# Patient Record
Sex: FEMALE | Race: WHITE | NOT HISPANIC OR LATINO | Employment: FULL TIME | URBAN - METROPOLITAN AREA
[De-identification: names, ages, dates, MRNs, and addresses within clinical notes are randomized per-mention and may not be internally consistent; named-entity substitution may affect disease eponyms.]

---

## 2017-04-17 ENCOUNTER — OFFICE VISIT - HEALTHEAST (OUTPATIENT)
Dept: FAMILY MEDICINE | Facility: CLINIC | Age: 57
End: 2017-04-17

## 2017-04-17 DIAGNOSIS — E78.5 DYSLIPIDEMIA: ICD-10-CM

## 2017-04-17 DIAGNOSIS — Z11.3 SCREEN FOR STD (SEXUALLY TRANSMITTED DISEASE): ICD-10-CM

## 2017-04-21 ENCOUNTER — AMBULATORY - HEALTHEAST (OUTPATIENT)
Dept: LAB | Facility: CLINIC | Age: 57
End: 2017-04-21

## 2017-04-21 DIAGNOSIS — Z11.3 SCREEN FOR STD (SEXUALLY TRANSMITTED DISEASE): ICD-10-CM

## 2017-04-21 DIAGNOSIS — E78.5 DYSLIPIDEMIA: ICD-10-CM

## 2017-04-21 LAB
CHOLEST SERPL-MCNC: 219 MG/DL
FASTING STATUS PATIENT QL REPORTED: YES
HDLC SERPL-MCNC: 87 MG/DL
HIV 1+2 AB+HIV1 P24 AG SERPL QL IA: NEGATIVE
LDLC SERPL CALC-MCNC: 121 MG/DL
TRIGL SERPL-MCNC: 55 MG/DL

## 2017-04-22 LAB — SYPHILIS RPR SCREEN - HISTORICAL: NORMAL

## 2017-04-24 ENCOUNTER — COMMUNICATION - HEALTHEAST (OUTPATIENT)
Dept: FAMILY MEDICINE | Facility: CLINIC | Age: 57
End: 2017-04-24

## 2017-04-24 LAB
HAV IGM SERPL QL IA: NEGATIVE
HBV CORE IGM SERPL QL IA: NEGATIVE
HBV SURFACE AG SERPL QL IA: NEGATIVE
HCV AB SERPL QL IA: NEGATIVE
HSV1 IGG SERPL QL IA: POSITIVE
HSV2 IGG SERPL QL IA: NEGATIVE

## 2017-07-12 ENCOUNTER — COMMUNICATION - HEALTHEAST (OUTPATIENT)
Dept: FAMILY MEDICINE | Facility: CLINIC | Age: 57
End: 2017-07-12

## 2017-07-12 DIAGNOSIS — E78.5 DYSLIPIDEMIA: ICD-10-CM

## 2017-12-11 ENCOUNTER — COMMUNICATION - HEALTHEAST (OUTPATIENT)
Dept: TELEHEALTH | Facility: CLINIC | Age: 57
End: 2017-12-11

## 2017-12-11 ENCOUNTER — OFFICE VISIT - HEALTHEAST (OUTPATIENT)
Dept: FAMILY MEDICINE | Facility: CLINIC | Age: 57
End: 2017-12-11

## 2017-12-11 DIAGNOSIS — R19.7 DIARRHEA: ICD-10-CM

## 2017-12-13 ENCOUNTER — COMMUNICATION - HEALTHEAST (OUTPATIENT)
Dept: FAMILY MEDICINE | Facility: CLINIC | Age: 57
End: 2017-12-13

## 2017-12-14 ENCOUNTER — COMMUNICATION - HEALTHEAST (OUTPATIENT)
Dept: SCHEDULING | Facility: CLINIC | Age: 57
End: 2017-12-14

## 2017-12-14 ENCOUNTER — AMBULATORY - HEALTHEAST (OUTPATIENT)
Dept: LAB | Facility: CLINIC | Age: 57
End: 2017-12-14

## 2017-12-14 DIAGNOSIS — R19.7 DIARRHEA: ICD-10-CM

## 2017-12-14 RX ORDER — LOPERAMIDE HYDROCHLORIDE 2 MG/1
TABLET ORAL
Qty: 30 TABLET | Refills: 0 | Status: SHIPPED | OUTPATIENT
Start: 2017-12-14

## 2017-12-15 ENCOUNTER — COMMUNICATION - HEALTHEAST (OUTPATIENT)
Dept: FAMILY MEDICINE | Facility: CLINIC | Age: 57
End: 2017-12-15

## 2018-01-16 ENCOUNTER — COMMUNICATION - HEALTHEAST (OUTPATIENT)
Dept: FAMILY MEDICINE | Facility: CLINIC | Age: 58
End: 2018-01-16

## 2018-05-05 ENCOUNTER — COMMUNICATION - HEALTHEAST (OUTPATIENT)
Dept: FAMILY MEDICINE | Facility: CLINIC | Age: 58
End: 2018-05-05

## 2018-05-05 DIAGNOSIS — E78.5 DYSLIPIDEMIA: ICD-10-CM

## 2018-09-22 ENCOUNTER — COMMUNICATION - HEALTHEAST (OUTPATIENT)
Dept: FAMILY MEDICINE | Facility: CLINIC | Age: 58
End: 2018-09-22

## 2018-09-22 DIAGNOSIS — E78.5 DYSLIPIDEMIA: ICD-10-CM

## 2018-09-23 RX ORDER — ATORVASTATIN CALCIUM 10 MG/1
10 TABLET, FILM COATED ORAL DAILY
Qty: 90 TABLET | Refills: 0 | Status: SHIPPED | OUTPATIENT
Start: 2018-09-23

## 2021-05-25 ENCOUNTER — RECORDS - HEALTHEAST (OUTPATIENT)
Dept: ADMINISTRATIVE | Facility: CLINIC | Age: 61
End: 2021-05-25

## 2021-05-29 ENCOUNTER — RECORDS - HEALTHEAST (OUTPATIENT)
Dept: ADMINISTRATIVE | Facility: CLINIC | Age: 61
End: 2021-05-29

## 2021-05-30 VITALS — WEIGHT: 135.13 LBS | BODY MASS INDEX: 24.52 KG/M2

## 2021-05-31 VITALS — WEIGHT: 129.5 LBS | BODY MASS INDEX: 23.5 KG/M2

## 2021-06-10 NOTE — PROGRESS NOTES
ASSESSMENT/PLAN:  1. Dyslipidemia  lipitor started in November 2016, no side effects so far  Will come back fasting for labs  Answered questions about executive cardiovascular eval at [facility]  - Lipid [place]; Future  - Comprehensive Metabolic Panel; Future    2. Screen for STD (sexually transmitted disease)  asymptomatic  - Hepatitis Acute Evaluation; Future  - Syphilis Screen, [place]; Future  - HIV Antigen/Antibody Screening [place]; Future  - Chlamydia trachomatis & Neisseria gonorrhoeae, Amplified Detection  - Herpes simplex Virus (Type 1 and 2) IgG Antibody; Future    3.  Hot flashes  Discussed pros & cons of treatment and when to start treatment  Patient opted for monitor symptoms & supportive cares    Orders Placed This Encounter   Procedures     Chlamydia trachomatis & Neisseria gonorrhoeae, Amplified Detection     Order Specific Question:   Specimen Source?     Answer:   Urine     Lipid [place]     Standing Status:   Future     Standing Expiration Date:   4/17/2018     Order Specific Question:   Fasting is required?     Answer:   Yes     Comprehensive Metabolic Panel     Standing Status:   Future     Standing Expiration Date:   4/17/2018     Hepatitis Acute Evaluation     Standing Status:   Future     Standing Expiration Date:   4/17/2018     Syphilis Screen, [facility]ascade     Standing Status:   Future     Standing Expiration Date:   4/17/2018     HIV Antigen/Antibody Screening [place]     Standing Status:   Future     Standing Expiration Date:   4/17/2018     Herpes simplex Virus (Type 1 and 2) IgG Antibody     Standing Status:   Future     Standing Expiration Date:   4/17/2018           CHIEF COMPLAINT:  Chief Complaint   Patient presents with     bloodwork     cholesterol had a small bite of protein bar at 4 am this morning     STD test     discuss hormone     Medication Refill       HISTORY OF PRESENT ILLNESS:  Solange is a 56 y.o. female presenting to the clinic today for a follow up for  dyslipidemia. She is currently taking atorvastatin 10mg. She has had not any side effects from the medication. She is not completely fasting this morning for a cholesterol check.     Hot Flashes: She has never been on hormones, except for OCP's years ago. Her concerns are hot flashes and possible bone mass loss. She has not had a period in 3 years. She is curious about the risk and benefits of hormone replacements. Her hot flashes are mostly during night time, but she has some occasional day time hot flashes.     Family History of Alzheimer's: Her mother has Alzheimer's disease, and has been on medication for about 10 years. She did ask her mother neurologist about genetic screens/testing for the disease. He recommended against it. She knows that she can prevent the disease, but wants to try to do things to keep her mind sharp.     REVIEW OF SYSTEMS:   She would like to have an STD screening. She denies any vaginal or urinary symptoms. All other systems are negative.    PFSH:  Family history of Alzheimer's disease in mother.     TOBACCO USE:  History   Smoking Status     Never Smoker   Smokeless Tobacco     Never Used       VITALS:  Vitals:    04/17/17 1049   BP: 110/62   Patient Site: Left Arm   Patient Position: Sitting   Cuff Size: Adult Regular   Pulse: (!) 56   Weight: 135 lb 2 oz (61.3 kg)     Wt Readings from Last 3 Encounters:   04/17/17 135 lb 2 oz (61.3 kg)   11/07/16 134 lb 1 oz (60.8 kg)   09/06/16 135 lb 14.4 oz (61.6 kg)       PHYSICAL EXAM:  Constitutional: Patient is oriented to person, place, and time. Patient appears well-developed and well-nourished. No distress.   Head: Normocephalic and atraumatic.   Right Ear: External ear normal.   Left Ear: External ear normal.   Nose: Nose normal.   Mouth/Throat: Oropharynx is clear and moist. No oropharyngeal exudate.   Eyes: Conjunctivae and EOM are normal. Pupils are equal, round, and reactive to light. Right eye exhibits no discharge. Left eye exhibits  no discharge. No scleral icterus.   Neck: Neck supple. No JVD present. No tracheal deviation present. No thyromegaly present.   Cardiovascular: Normal rate, regular rhythm, normal heart sounds and intact distal pulses. No murmur heard.   Pulmonary/Chest: Effort normal and breath sounds normal. No stridor. No respiratory distress. Patient has no wheezes, no rales, exhibits no tenderness.   Abdominal: Soft. Bowel sounds are normal. Patient exhibits no distension and no mass. There is no tenderness. There is no rebound and no guarding.   Lymphadenopathy:  Patient has no cervical adenopathy.   Neurological: Patient is alert and oriented to person, place, and time. Patient has normal reflexes. No cranial nerve deficit. Coordination normal.   Skin: Skin is warm and dry. No rash noted. Patient is not diaphoretic. No erythema. No pallor.    Results for orders placed or performed in visit on 11/07/16   Lipid [place]   Result Value Ref Range    Cholesterol 296 (H) <=199 mg/dL    Triglycerides 85 <=149 mg/dL    HDL Cholesterol 86 >=50 mg/dL    LDL Calculated 193 (H) <=129 mg/dL    Patient Fasting > 8hrs? Yes    Comprehensive Metabolic Panel   Result Value Ref Range    Sodium 141 136 - 145 mmol/L    Potassium 3.9 3.5 - 5.0 mmol/L    Chloride 101 98 - 107 mmol/L    CO2 29 22 - 31 mmol/L    Anion Gap, Calculation 11 5 - 18 mmol/L    Glucose 82 70 - 125 mg/dL    BUN 11 8 - 22 mg/dL    Creatinine 0.83 0.60 - 1.10 mg/dL    GFR MDRD Af Amer >60 >60 mL/min/1.73m2    GFR MDRD Non Af Amer >60 >60 mL/min/1.73m2    Bilirubin, Total 0.6 0.0 - 1.0 mg/dL    Calcium 10.0 8.5 - 10.5 mg/dL    Protein, Total 7.4 6.0 - 8.0 g/dL    Albumin 4.2 3.5 - 5.0 g/dL    Alkaline Phosphatase 101 45 - 120 U/L    AST 41 (H) 0 - 40 U/L    ALT 35 0 - 45 U/L   Vitamin D, Total (25-Hydroxy)   Result Value Ref Range    Vitamin D, Total (25-Hydroxy) 41.7 30.0 - 80.0 ng/mL           ADDITIONAL HISTORY SUMMARIZED (2): None.  DECISION TO OBTAIN EXTRA INFORMATION (1):  None.   RADIOLOGY TESTS (1): None.  LABS (1): Reviewed and ordered labs today.  MEDICINE TESTS (1): None.  INDEPENDENT REVIEW (2 each): None.     The visit lasted a total of 25 minutes face to face with the patient. Over 50% of the time was spent counseling and educating the patient about dyslipidemia management and STD screening.    IAdelaide, am scribing for and in the presence of, Dr. Mejias.    I, Dr. Mejias, personally performed the services described in this documentation, as scribed by Adelaide Bradley in my presence, and it is both accurate and complete.    MEDICATIONS:  Current Outpatient Prescriptions   Medication Sig Dispense Refill     atorvastatin (LIPITOR) 10 MG tablet Take 1 tablet (10 mg total) by mouth daily. 90 tablet 1     No current facility-administered medications for this visit.        Total data points: 1

## 2021-06-14 NOTE — PROGRESS NOTES
ASSESSMENT/PLAN:  Diarrhea  Suspect acute and self-limiting diarrhea  Advised fluid hydration  Stop anti-diarrheal medicine and monitor symptoms  Stop NSAIDs  Further management will depend on clinical status and lab results  -     Basic Metabolic Panel  -     Comprehensive Metabolic Panel      Orders Placed This Encounter   Procedures     Basic Metabolic Panel     Comprehensive Metabolic Panel       CHIEF COMPLAINT:  Chief Complaint   Patient presents with     stomach issues     cramping and diarrhea x 4 days, Got sick after travelling to [place]. Took OTC for diarrhea and helped a little bit       HISTORY OF PRESENT ILLNESS:  Solange is a 57 y.o. female presenting to the clinic today diarrhea. She was in [place] for work 4 days ago. She went out for lunch, and then went back to work while in [place]. That night she had abdominal cramping and pain in the lower abdomen. She did have onset of diarrhea. Her coworker with her had similar symptoms. She has been taking OTC antidiarrheal with some minimal relief. She thought it was food poisoning, but her symptoms have persisted. No vomiting. Yesterday, she had one episode of loose stool. Today, she had one normal stool, and one looser stool. No fevers. She has been watching her diet and trying to keep hydrated. Her symptoms are becoming a bit more intermittent, but are now lingering. No sore throat, cough, runny nose. She has been taking Aleve.     REVIEW OF SYSTEMS:    HENT: Negative.   Eyes: Negative.   Respiratory: Negative.   Cardiovascular: Negative.    Endocrine: Negative.   Genitourinary: Negative.   Musculoskeletal: Negative.   Skin: Negative.   Allergic/Immunologic: Negative.   Neurological: Negative.   Hematological: Negative.   Psychiatric/Behavioral: Negative.   All other systems are negative.    PFSH:  No new history.     TOBACCO USE:  History   Smoking Status     Never Smoker   Smokeless Tobacco     Never Used       VITALS:  Vitals:    12/11/17 1507   BP:  104/76   Patient Site: Left Arm   Patient Position: Sitting   Cuff Size: Adult Regular   Pulse: 60   Weight: 129 lb 8 oz (58.7 kg)     Wt Readings from Last 3 Encounters:   12/11/17 129 lb 8 oz (58.7 kg)   04/17/17 135 lb 2 oz (61.3 kg)   11/07/16 134 lb 1 oz (60.8 kg)       PHYSICAL EXAM:  Constitutional: Patient is oriented to person, place, and time. Patient appears well-developed and well-nourished. No distress.   Head: Normocephalic and atraumatic.   Right Ear: External ear normal. Ear canal and TM normal.   Left Ear: External ear normal. Ear canal and TM normal.   Nose: Nose normal.   Mouth/Throat: Oropharynx is clear and moist. No oropharyngeal exudate.   Eyes: Conjunctivae and EOM are normal. Pupils are equal, round, and reactive to light. Right eye exhibits no discharge. Left eye exhibits no discharge. No scleral icterus.   Cardiovascular: Normal rate, regular rhythm, normal heart sounds and intact distal pulses. No murmur heard.   Pulmonary/Chest: Effort normal and breath sounds normal. No stridor. No respiratory distress. Patient has no wheezes, no rales, exhibits no tenderness.   Abdominal: Soft. Bowel sounds are normal. Patient exhibits no distension and no mass. There is no rebound and no guarding. Tenderness to palpation to the epigastrium.       Results for orders placed or performed in visit on 04/21/17   Lipid [place]   Result Value Ref Range    Cholesterol 219 (H) <=199 mg/dL    Triglycerides 55 <=149 mg/dL    HDL Cholesterol 87 >=50 mg/dL    LDL Calculated 121 <=129 mg/dL    Patient Fasting > 8hrs? Yes    Comprehensive Metabolic Panel   Result Value Ref Range    Sodium 142 136 - 145 mmol/L    Potassium 4.0 3.5 - 5.0 mmol/L    Chloride 103 98 - 107 mmol/L    CO2 27 22 - 31 mmol/L    Anion Gap, Calculation 12 5 - 18 mmol/L    Glucose 85 70 - 125 mg/dL    BUN 12 8 - 22 mg/dL    Creatinine 0.82 0.60 - 1.10 mg/dL    GFR MDRD Af Amer >60 >60 mL/min/1.73m2    GFR MDRD Non Af Amer >60 >60 mL/min/1.73m2     Bilirubin, Total 0.4 0.0 - 1.0 mg/dL    Calcium 10.0 8.5 - 10.5 mg/dL    Protein, Total 7.5 6.0 - 8.0 g/dL    Albumin 4.0 3.5 - 5.0 g/dL    Alkaline Phosphatase 100 45 - 120 U/L    AST 37 0 - 40 U/L    ALT 49 (H) 0 - 45 U/L   Hepatitis Acute Evaluation   Result Value Ref Range    Hepatitis A Antibody, IgM Negative Negative    Hepatitis B Core Megha, IgM Negative Negative    Hepatitis B Surface Ag Negative Negative    Hepatitis C Ab Negative Negative   Syphilis Screen, [facility]   Result Value Ref Range    Syphilis Screen [place] Non-Reactive Non-Reactive   HIV Antigen/Antibody Screening [place]   Result Value Ref Range    HIV Antigen / Antibody Negative Negative   Herpes simplex Virus (Type 1 and 2) IgG Antibody   Result Value Ref Range    Herpes simplex Virus (Type 1) IgG Antibody Positive (!) Negative    Herpes simplex Virus (Type 2) IgG Antibody Negative Negative         ADDITIONAL HISTORY SUMMARIZED (2): None.  DECISION TO OBTAIN EXTRA INFORMATION (1): None.   RADIOLOGY TESTS (1): None.  LABS (1): Ordered labs today.  MEDICINE TESTS (1): None.  INDEPENDENT REVIEW (2 each): None.     I, Adelaide Bradley, am scribing for and in the presence of, Dr. Mejias.    I, Meri Raza MD , personally performed the services described in this documentation, as scribed by Adelaide Bradley in my presence, and it is both accurate and complete.    MEDICATIONS:  Current Outpatient Prescriptions   Medication Sig Dispense Refill     atorvastatin (LIPITOR) 10 MG tablet Take 1 tablet (10 mg total) by mouth daily. 90 tablet 2     No current facility-administered medications for this visit.        Total data points: 1

## 2021-08-21 ENCOUNTER — HEALTH MAINTENANCE LETTER (OUTPATIENT)
Age: 61
End: 2021-08-21

## 2021-10-16 ENCOUNTER — HEALTH MAINTENANCE LETTER (OUTPATIENT)
Age: 61
End: 2021-10-16

## 2022-10-01 ENCOUNTER — HEALTH MAINTENANCE LETTER (OUTPATIENT)
Age: 62
End: 2022-10-01

## 2023-02-05 ENCOUNTER — HEALTH MAINTENANCE LETTER (OUTPATIENT)
Age: 63
End: 2023-02-05

## 2023-07-05 ENCOUNTER — TRANSFERRED RECORDS (OUTPATIENT)
Dept: HEALTH INFORMATION MANAGEMENT | Facility: CLINIC | Age: 63
End: 2023-07-05

## 2023-10-15 ENCOUNTER — HEALTH MAINTENANCE LETTER (OUTPATIENT)
Age: 63
End: 2023-10-15

## 2024-03-03 ENCOUNTER — HEALTH MAINTENANCE LETTER (OUTPATIENT)
Age: 64
End: 2024-03-03

## 2024-09-08 ENCOUNTER — APPOINTMENT (OUTPATIENT)
Dept: CT IMAGING | Facility: CLINIC | Age: 64
End: 2024-09-08
Attending: EMERGENCY MEDICINE
Payer: COMMERCIAL

## 2024-09-08 ENCOUNTER — HOSPITAL ENCOUNTER (EMERGENCY)
Facility: CLINIC | Age: 64
Discharge: HOME | End: 2024-09-08
Attending: EMERGENCY MEDICINE | Admitting: EMERGENCY MEDICINE
Payer: COMMERCIAL

## 2024-09-08 VITALS
TEMPERATURE: 97.5 F | SYSTOLIC BLOOD PRESSURE: 164 MMHG | HEART RATE: 57 BPM | WEIGHT: 140 LBS | HEIGHT: 62 IN | RESPIRATION RATE: 18 BRPM | OXYGEN SATURATION: 97 % | DIASTOLIC BLOOD PRESSURE: 77 MMHG | BODY MASS INDEX: 25.76 KG/M2

## 2024-09-08 DIAGNOSIS — R51.9 ACUTE NONINTRACTABLE HEADACHE, UNSPECIFIED HEADACHE TYPE: ICD-10-CM

## 2024-09-08 LAB
ANION GAP SERPL CALCULATED.3IONS-SCNC: 12 MMOL/L (ref 7–15)
BASOPHILS # BLD AUTO: 0.1 10E3/UL (ref 0–0.2)
BASOPHILS NFR BLD AUTO: 1 %
BUN SERPL-MCNC: 11.3 MG/DL (ref 8–23)
CALCIUM SERPL-MCNC: 9 MG/DL (ref 8.8–10.4)
CHLORIDE SERPL-SCNC: 101 MMOL/L (ref 98–107)
CREAT SERPL-MCNC: 0.98 MG/DL (ref 0.51–0.95)
CRP SERPL-MCNC: 5.89 MG/L
EGFRCR SERPLBLD CKD-EPI 2021: 64 ML/MIN/1.73M2
EOSINOPHIL # BLD AUTO: 0.2 10E3/UL (ref 0–0.7)
EOSINOPHIL NFR BLD AUTO: 3 %
ERYTHROCYTE [DISTWIDTH] IN BLOOD BY AUTOMATED COUNT: 14.1 % (ref 10–15)
ERYTHROCYTE [SEDIMENTATION RATE] IN BLOOD BY WESTERGREN METHOD: 8 MM/HR (ref 0–30)
FLUAV RNA SPEC QL NAA+PROBE: NEGATIVE
FLUBV RNA RESP QL NAA+PROBE: NEGATIVE
GLUCOSE SERPL-MCNC: 85 MG/DL (ref 70–99)
HCO3 SERPL-SCNC: 30 MMOL/L (ref 22–29)
HCT VFR BLD AUTO: 46.8 % (ref 35–47)
HGB BLD-MCNC: 15.7 G/DL (ref 11.7–15.7)
IMM GRANULOCYTES # BLD: 0.1 10E3/UL
IMM GRANULOCYTES NFR BLD: 1 %
LYMPHOCYTES # BLD AUTO: 2.4 10E3/UL (ref 0.8–5.3)
LYMPHOCYTES NFR BLD AUTO: 31 %
MCH RBC QN AUTO: 30.7 PG (ref 26.5–33)
MCHC RBC AUTO-ENTMCNC: 33.5 G/DL (ref 31.5–36.5)
MCV RBC AUTO: 91 FL (ref 78–100)
MONOCYTES # BLD AUTO: 0.7 10E3/UL (ref 0–1.3)
MONOCYTES NFR BLD AUTO: 9 %
NEUTROPHILS # BLD AUTO: 4.2 10E3/UL (ref 1.6–8.3)
NEUTROPHILS NFR BLD AUTO: 55 %
NRBC # BLD AUTO: 0 10E3/UL
NRBC BLD AUTO-RTO: 0 /100
PLATELET # BLD AUTO: 213 10E3/UL (ref 150–450)
POTASSIUM SERPL-SCNC: 3.8 MMOL/L (ref 3.4–5.3)
RBC # BLD AUTO: 5.12 10E6/UL (ref 3.8–5.2)
RSV RNA SPEC NAA+PROBE: NEGATIVE
SARS-COV-2 RNA RESP QL NAA+PROBE: NEGATIVE
SODIUM SERPL-SCNC: 143 MMOL/L (ref 135–145)
WBC # BLD AUTO: 7.6 10E3/UL (ref 4–11)

## 2024-09-08 PROCEDURE — 250N000011 HC RX IP 250 OP 636: Performed by: EMERGENCY MEDICINE

## 2024-09-08 PROCEDURE — 96375 TX/PRO/DX INJ NEW DRUG ADDON: CPT

## 2024-09-08 PROCEDURE — 36415 COLL VENOUS BLD VENIPUNCTURE: CPT | Performed by: EMERGENCY MEDICINE

## 2024-09-08 PROCEDURE — 96361 HYDRATE IV INFUSION ADD-ON: CPT

## 2024-09-08 PROCEDURE — 85652 RBC SED RATE AUTOMATED: CPT | Performed by: EMERGENCY MEDICINE

## 2024-09-08 PROCEDURE — 87637 SARSCOV2&INF A&B&RSV AMP PRB: CPT | Performed by: EMERGENCY MEDICINE

## 2024-09-08 PROCEDURE — 258N000003 HC RX IP 258 OP 636: Performed by: EMERGENCY MEDICINE

## 2024-09-08 PROCEDURE — 96374 THER/PROPH/DIAG INJ IV PUSH: CPT

## 2024-09-08 PROCEDURE — 80048 BASIC METABOLIC PNL TOTAL CA: CPT | Performed by: EMERGENCY MEDICINE

## 2024-09-08 PROCEDURE — 85025 COMPLETE CBC W/AUTO DIFF WBC: CPT | Performed by: EMERGENCY MEDICINE

## 2024-09-08 PROCEDURE — 70450 CT HEAD/BRAIN W/O DYE: CPT

## 2024-09-08 PROCEDURE — 99285 EMERGENCY DEPT VISIT HI MDM: CPT | Mod: 25

## 2024-09-08 PROCEDURE — 86140 C-REACTIVE PROTEIN: CPT | Performed by: EMERGENCY MEDICINE

## 2024-09-08 RX ORDER — DIPHENHYDRAMINE HYDROCHLORIDE 50 MG/ML
25 INJECTION INTRAMUSCULAR; INTRAVENOUS ONCE
Status: COMPLETED | OUTPATIENT
Start: 2024-09-08 | End: 2024-09-08

## 2024-09-08 RX ORDER — KETOROLAC TROMETHAMINE 15 MG/ML
15 INJECTION, SOLUTION INTRAMUSCULAR; INTRAVENOUS ONCE
Status: COMPLETED | OUTPATIENT
Start: 2024-09-08 | End: 2024-09-08

## 2024-09-08 RX ADMIN — PROCHLORPERAZINE EDISYLATE 10 MG: 5 INJECTION INTRAMUSCULAR; INTRAVENOUS at 09:05

## 2024-09-08 RX ADMIN — DIPHENHYDRAMINE HYDROCHLORIDE 25 MG: 50 INJECTION INTRAMUSCULAR; INTRAVENOUS at 09:03

## 2024-09-08 RX ADMIN — SODIUM CHLORIDE 1000 ML: 9 INJECTION, SOLUTION INTRAVENOUS at 08:58

## 2024-09-08 RX ADMIN — KETOROLAC TROMETHAMINE 15 MG: 15 INJECTION, SOLUTION INTRAMUSCULAR; INTRAVENOUS at 09:01

## 2024-09-08 ASSESSMENT — ACTIVITIES OF DAILY LIVING (ADL)
ADLS_ACUITY_SCORE: 35

## 2024-09-08 ASSESSMENT — COLUMBIA-SUICIDE SEVERITY RATING SCALE - C-SSRS
6. HAVE YOU EVER DONE ANYTHING, STARTED TO DO ANYTHING, OR PREPARED TO DO ANYTHING TO END YOUR LIFE?: NO
2. HAVE YOU ACTUALLY HAD ANY THOUGHTS OF KILLING YOURSELF IN THE PAST MONTH?: NO
1. IN THE PAST MONTH, HAVE YOU WISHED YOU WERE DEAD OR WISHED YOU COULD GO TO SLEEP AND NOT WAKE UP?: NO

## 2024-09-08 NOTE — ED PROVIDER NOTES
Calhoun Mcardle is a 32 y.o. female presents to office for establish care, seizures, and swelling in joints    Medication list has been reviewed with Calhoun Mcardle and updated as of today's date     Health Maintenance items with a due date reviewed with patient:  Health Maintenance Due   Topic Date Due    DTaP/Tdap/Td series (1 - Tdap) 05/16/2013    PAP AKA CERVICAL CYTOLOGY  05/16/2013    Influenza Age 9 to Adult  08/01/2019 EMERGENCY DEPARTMENT ENCOUNTER      NAME: Solange Giron  AGE: 64 year old female  YOB: 1960  MRN: [id number]  EVALUATION DATE & TIME: 2024  8:07 AM    PCP: [facility]    ED PROVIDER: Kennedy Santos M.D.      Chief Complaint   Patient presents with    Headache         FINAL IMPRESSION:  1. Acute nonintractable headache, unspecified headache type          ED COURSE & MEDICAL DECISION MAKIN year old female presents to the Emergency Department for evaluation of headache.  Patient has increasing right frontal headache for the last 3 days.  This has been gradual in onset but persistent.  Patient has a reassuring neurological exam.  She does not have any fever, neck stiffness, or anything to raise concern for CNS infection.  Screening noncontrast head CT is negative for any evidence of mass or recent hemorrhage.  Based on her relatively more benign symptoms, improved after medication here, I do not have any suspicion for occult aneurysm or sentinel bleed that would benefit from lumbar puncture for further rule out at this time.  I did briefly discuss this with the patient who is in agreement.  Her other labs include negative inflammatory markers ruling out temporal arteritis.  Patient was resting much more comfortably after migraine cocktail as below.  We discussed continued supportive management for what could represent a primary headache like tension or migraine.  Patient was in agreement with this.  Return precautions reviewed. Clinic follow up encouraged. Patient discharged in stable condition.    At the conclusion of the encounter I discussed the results of all of the tests and the disposition. The questions were answered. The patient or family acknowledged understanding and was agreeable with the care plan.     8:16 AM I met with the patient, obtained history, performed an initial exam, and discussed options and plan for diagnostics and treatment here in the  ED.      Medical Decision Making  Obtained supplemental history:Supplemental history obtained?: No  Reviewed external records: External records reviewed?: No  Care impacted by chronic illness:Hyperlipidemia  Care significantly affected by social determinants of health:N/A  Did you consider but not order tests?: Work up considered but not performed and documented in chart, if applicable  Did you interpret images independently?: Independent interpretation of ECG and images noted in documentation, when applicable.  Consultation discussion with other provider:Did you involve another provider (consultant, , pharmacy, etc.)?: No  Discharge. No recommendations on prescription strength medication(s). N/A.            MEDICATIONS GIVEN IN THE EMERGENCY:  Medications   prochlorperazine (COMPAZINE) injection 10 mg (10 mg Intravenous $Given 9/8/24 0905)   diphenhydrAMINE (BENADRYL) injection 25 mg (25 mg Intravenous $Given 9/8/24 0903)   ketorolac (TORADOL) injection 15 mg (15 mg Intravenous $Given 9/8/24 0901)   sodium chloride 0.9% BOLUS 1,000 mL (0 mLs Intravenous Stopped 9/8/24 1015)       NEW PRESCRIPTIONS STARTED AT TODAY'S ER VISIT  New Prescriptions    No medications on file          =================================================================    HPI    Patient information was obtained from: Patient    Use of : N/A      Solange Giron is a 64 year old female with a pertinent history of hyperlipidemia who presents to this ED for evaluation of headache.     The patient reports persistent right-sided headache that started 9/6. She was traveling from [place], when she got on the airplane, her headache worsened. Patient describes the pain as throbbing and sharp. She has taken Tylenol for pain control with no relief.     Denies fever, weakness, numbness, rhinorrhea, congestion.     REVIEW OF SYSTEMS   All systems reviewed and negative except as noted in HPI.    PAST MEDICAL HISTORY:  No past medical  history on file.    PAST SURGICAL HISTORY:  Past Surgical History:   Procedure Laterality Date    HC REMOVAL OF TONSILS,12+ Y/O      Description: Tonsillectomy Over Age 12;  Recorded: 07/16/2010;           CURRENT MEDICATIONS:    No current facility-administered medications for this encounter.     Current Outpatient Medications   Medication Sig Dispense Refill    atorvastatin (LIPITOR) 10 MG tablet [ATORVASTATIN (LIPITOR) 10 MG TABLET] Take 1 tablet (10 mg total) by mouth daily. 90 tablet 0    loperamide (IMODIUM A-D) 2 mg tablet [LOPERAMIDE (IMODIUM A-D) 2 MG TABLET] 4 mg after first loose stool, followed by 2 mg after each subsequent stool (maximum dose: 8 mg/day) 30 tablet 0         ALLERGIES:  No Known Allergies    FAMILY HISTORY:  Family History   Problem Relation Age of Onset    Alzheimer Disease Mother     Hyperlipidemia Mother     Hyperlipidemia Father     Cerebrovascular Disease Father     Breast Cancer Paternal Aunt     Dyslipidemia Paternal Aunt     Dyslipidemia Paternal Uncle     Hyperlipidemia Maternal Aunt     Hyperlipidemia Brother        SOCIAL HISTORY:   Social History     Socioeconomic History    Marital status:    Tobacco Use    Smoking status: Never    Smokeless tobacco: Never     Social Determinants of Health      [facility] [employer] [facility]    Financial Resource Strain   Transportation Needs: No Transportation Needs (7/6/2022)    Received from [facility]    PRAPARE - Transportation     Lack of Transportation (Medical): No     Lack of Transportation (Non-Medical): No   Physical Activity: Sufficiently Active (7/6/2022)    Received from [facility]    Exercise Vital Sign     Days of Exercise per Week: 5 days     Minutes of Exercise per Session: 60 min   Stress: Stress Concern Present (7/6/2022)    Received from [facility]    [employer] - Occupational Stress Questionnaire     Feeling  "of Stress : To some extent   Social Connections: Moderately Integrated (7/6/2022)    Received from [facility]    Social Connection and Isolation Panel [NHANES]     Frequency of Communication with Friends and Family: More than three times a week     Frequency of Social Gatherings with Friends and Family: More than three times a week     Attends [employer] Services: More than 4 times per year     Active Member of Clubs or Organizations: Yes     Attends Club or Organization Meetings: More than 4 times per year     Marital Status:    Interpersonal Safety: Unknown (7/6/2022)    Received from [facility]    Humiliation, Afraid, Rape, and Kick questionnaire     Fear of Current or Ex-Partner: No   Housing Stability: Low Risk  (7/6/2022)    Received from [facility]    Housing Stability Vital Sign     Unable to Pay for Housing in the Last Year: No     Number of Places Lived in the Last Year: 1     In the last 12 months, was there a time when you did not have a steady place to sleep or slept in a shelter (including now)?: No       VITALS:  BP (!) 164/77   Pulse 57   Temp 97.5  F (36.4  C) (Temporal)   Resp 18   Ht 1.575 m (5' 2\")   Wt 63.5 kg (140 lb)   SpO2 97%   BMI 25.61 kg/m      PHYSICAL EXAM    Constitutional: Well developed, Well nourished, NAD.  HENT: Normocephalic, Atraumatic. Neck Supple.  Eyes: EOMI, Conjunctiva normal.  Respiratory: Breathing comfortably on room air. Speaks full sentences easily. Lungs clear to ascultation.  Cardiovascular: Normal heart rate, Regular rhythm. No peripheral edema.  Abdomen: Soft  Musculoskeletal: Good range of motion in all major joints. No major deformities noted.  Integument: Warm, Dry.  Neurologic: Fully awake, alert, oriented.  Face is symmetric.  Speech is normal.  Cranial nerves II through XII intact.  Strength is 5 out of 5 throughout bilateral upper and lower extremities.  Sensation to light touch intact x4.  Finger-nose-finger testing is normal.  Patient is " ambulatory.  Psychiatric: Cooperative. Affect appropriate.     LAB:  All pertinent labs reviewed and interpreted.  Labs Ordered and Resulted from Time of ED Arrival to Time of ED Departure   BASIC METABOLIC PANEL - Abnormal       Result Value    Sodium 143      Potassium 3.8      Chloride 101      Carbon Dioxide (CO2) 30 (*)     Anion Gap 12      Urea Nitrogen 11.3      Creatinine 0.98 (*)     GFR Estimate 64      Calcium 9.0      Glucose 85     CRP INFLAMMATION - Abnormal    CRP Inflammation 5.89 (*)    ERYTHROCYTE SEDIMENTATION RATE AUTO - Normal    Erythrocyte Sedimentation Rate 8     INFLUENZA A/B, RSV, & SARS-COV2 PCR - Normal    Influenza A PCR Negative      Influenza B PCR Negative      RSV PCR Negative      SARS CoV2 PCR Negative     CBC WITH PLATELETS AND DIFFERENTIAL    WBC Count 7.6      RBC Count 5.12      Hemoglobin 15.7      Hematocrit 46.8      MCV 91      MCH 30.7      MCHC 33.5      RDW 14.1      Platelet Count 213      % Neutrophils 55      % Lymphocytes 31      % Monocytes 9      % Eosinophils 3      % Basophils 1      % Immature Granulocytes 1      NRBCs per 100 WBC 0      Absolute Neutrophils 4.2      Absolute Lymphocytes 2.4      Absolute Monocytes 0.7      Absolute Eosinophils 0.2      Absolute Basophils 0.1      Absolute Immature Granulocytes 0.1      Absolute NRBCs 0.0         RADIOLOGY:  Reviewed all pertinent imaging. Please see official radiology report.  Head CT w/o contrast   Final Result   IMPRESSION:   1.  Normal head CT.          EKG:    None.     PROCEDURES:   None.       I, Hina Huggins, am serving as a scribe to document services personally performed by Dr. Kennedy Santos, based on my observation and the provider's statements to me. I, Kennedy Santos MD attest that Hina Huggins is acting in a scribe capacity, has observed my performance of the services and has documented them in accordance with my direction.    Kennedy Santos M.D.  Emergency  [facility] EMERGENCY ROOM  [address] [address]  [phone]  Dept: [phone]       Kennedy Santos MD  09/08/24 1033

## 2024-09-08 NOTE — DISCHARGE INSTRUCTIONS
You were seen in the emergency department for headache.  Your evaluation included lab testing, COVID and flu testing, and head CT.  All of this testing was negative for any acute problem to account for your symptoms.  Specifically we did not see any signs of bleeding, mass, or inflammation.  We discussed that your symptoms could be related to something like a tension or migraine type of headache.  We would recommend drinking plenty of liquids, trying to get adequate consistent sleep, and using Tylenol and ibuprofen every 6 hours for any recurrent headache symptoms.  Please try to follow-up with your primary clinic after this ED visit to review any ongoing concerns.

## 2024-09-08 NOTE — ED TRIAGE NOTES
Pt presents to the ED with c/o of a headache that started on Friday am that has continued through today without any relief. Pt was flying when the pain got worse. Pt reports the pain is mostly on the right side above her right eye. Pt denies loss of vision, or nausea.      Triage Assessment (Adult)       Row Name 09/08/24 0803          Triage Assessment    Airway WDL WDL        Respiratory WDL    Respiratory WDL WDL        Skin Circulation/Temperature WDL    Skin Circulation/Temperature WDL WDL        Cardiac WDL    Cardiac WDL WDL        Peripheral/Neurovascular WDL    Peripheral Neurovascular WDL WDL        Cognitive/Neuro/Behavioral WDL    Cognitive/Neuro/Behavioral WDL WDL

## 2025-05-04 ENCOUNTER — APPOINTMENT (OUTPATIENT)
Dept: CT IMAGING | Facility: CLINIC | Age: 65
End: 2025-05-04
Attending: EMERGENCY MEDICINE
Payer: COMMERCIAL

## 2025-05-04 ENCOUNTER — HOSPITAL ENCOUNTER (EMERGENCY)
Facility: CLINIC | Age: 65
Discharge: HOME | End: 2025-05-04
Attending: EMERGENCY MEDICINE | Admitting: EMERGENCY MEDICINE
Payer: COMMERCIAL

## 2025-05-04 VITALS
DIASTOLIC BLOOD PRESSURE: 73 MMHG | BODY MASS INDEX: 24.29 KG/M2 | RESPIRATION RATE: 23 BRPM | OXYGEN SATURATION: 97 % | HEIGHT: 62 IN | HEART RATE: 57 BPM | WEIGHT: 132 LBS | SYSTOLIC BLOOD PRESSURE: 113 MMHG

## 2025-05-04 DIAGNOSIS — H81.10 BENIGN PAROXYSMAL POSITIONAL VERTIGO, UNSPECIFIED LATERALITY: ICD-10-CM

## 2025-05-04 LAB
ALBUMIN SERPL BCG-MCNC: 3.9 G/DL (ref 3.5–5.2)
ALP SERPL-CCNC: 53 U/L (ref 40–150)
ALT SERPL W P-5'-P-CCNC: 34 U/L (ref 0–50)
ANION GAP SERPL CALCULATED.3IONS-SCNC: 11 MMOL/L (ref 7–15)
AST SERPL W P-5'-P-CCNC: 37 U/L (ref 0–45)
BASOPHILS # BLD AUTO: 0 10E3/UL (ref 0–0.2)
BASOPHILS NFR BLD AUTO: 0 %
BILIRUB DIRECT SERPL-MCNC: 0.15 MG/DL (ref 0–0.3)
BILIRUB SERPL-MCNC: 0.3 MG/DL
BUN SERPL-MCNC: 13.4 MG/DL (ref 8–23)
CALCIUM SERPL-MCNC: 9.6 MG/DL (ref 8.8–10.4)
CHLORIDE SERPL-SCNC: 102 MMOL/L (ref 98–107)
CREAT SERPL-MCNC: 0.82 MG/DL (ref 0.51–0.95)
EGFRCR SERPLBLD CKD-EPI 2021: 79 ML/MIN/1.73M2
EOSINOPHIL # BLD AUTO: 0.2 10E3/UL (ref 0–0.7)
EOSINOPHIL NFR BLD AUTO: 3 %
ERYTHROCYTE [DISTWIDTH] IN BLOOD BY AUTOMATED COUNT: 13.1 % (ref 10–15)
GLUCOSE SERPL-MCNC: 120 MG/DL (ref 70–99)
HCO3 SERPL-SCNC: 24 MMOL/L (ref 22–29)
HCT VFR BLD AUTO: 39.3 % (ref 35–47)
HGB BLD-MCNC: 13.6 G/DL (ref 11.7–15.7)
IMM GRANULOCYTES # BLD: 0 10E3/UL
IMM GRANULOCYTES NFR BLD: 0 %
LYMPHOCYTES # BLD AUTO: 1.8 10E3/UL (ref 0.8–5.3)
LYMPHOCYTES NFR BLD AUTO: 26 %
MAGNESIUM SERPL-MCNC: 2 MG/DL (ref 1.7–2.3)
MCH RBC QN AUTO: 29.1 PG (ref 26.5–33)
MCHC RBC AUTO-ENTMCNC: 34.6 G/DL (ref 31.5–36.5)
MCV RBC AUTO: 84 FL (ref 78–100)
MONOCYTES # BLD AUTO: 0.7 10E3/UL (ref 0–1.3)
MONOCYTES NFR BLD AUTO: 10 %
NEUTROPHILS # BLD AUTO: 4.2 10E3/UL (ref 1.6–8.3)
NEUTROPHILS NFR BLD AUTO: 61 %
NRBC # BLD AUTO: 0 10E3/UL
NRBC BLD AUTO-RTO: 0 /100
PLATELET # BLD AUTO: 163 10E3/UL (ref 150–450)
POTASSIUM SERPL-SCNC: 3.5 MMOL/L (ref 3.4–5.3)
PROT SERPL-MCNC: 6.1 G/DL (ref 6.4–8.3)
RBC # BLD AUTO: 4.68 10E6/UL (ref 3.8–5.2)
SODIUM SERPL-SCNC: 137 MMOL/L (ref 135–145)
WBC # BLD AUTO: 7 10E3/UL (ref 4–11)

## 2025-05-04 PROCEDURE — 36415 COLL VENOUS BLD VENIPUNCTURE: CPT | Performed by: EMERGENCY MEDICINE

## 2025-05-04 PROCEDURE — 85004 AUTOMATED DIFF WBC COUNT: CPT | Performed by: EMERGENCY MEDICINE

## 2025-05-04 PROCEDURE — 93005 ELECTROCARDIOGRAM TRACING: CPT | Performed by: EMERGENCY MEDICINE

## 2025-05-04 PROCEDURE — 250N000011 HC RX IP 250 OP 636: Performed by: EMERGENCY MEDICINE

## 2025-05-04 PROCEDURE — 80048 BASIC METABOLIC PNL TOTAL CA: CPT | Performed by: EMERGENCY MEDICINE

## 2025-05-04 PROCEDURE — 82248 BILIRUBIN DIRECT: CPT | Performed by: EMERGENCY MEDICINE

## 2025-05-04 PROCEDURE — 99285 EMERGENCY DEPT VISIT HI MDM: CPT | Mod: 25

## 2025-05-04 PROCEDURE — 96374 THER/PROPH/DIAG INJ IV PUSH: CPT | Mod: 59

## 2025-05-04 PROCEDURE — 96361 HYDRATE IV INFUSION ADD-ON: CPT

## 2025-05-04 PROCEDURE — 83735 ASSAY OF MAGNESIUM: CPT | Performed by: EMERGENCY MEDICINE

## 2025-05-04 PROCEDURE — 250N000013 HC RX MED GY IP 250 OP 250 PS 637: Performed by: EMERGENCY MEDICINE

## 2025-05-04 PROCEDURE — 258N000003 HC RX IP 258 OP 636: Performed by: EMERGENCY MEDICINE

## 2025-05-04 PROCEDURE — 70498 CT ANGIOGRAPHY NECK: CPT

## 2025-05-04 RX ORDER — MECLIZINE HYDROCHLORIDE 25 MG/1
25 TABLET ORAL 3 TIMES DAILY PRN
Qty: 21 TABLET | Refills: 0 | Status: SHIPPED | OUTPATIENT
Start: 2025-05-04

## 2025-05-04 RX ORDER — MECLIZINE HYDROCHLORIDE 25 MG/1
25 TABLET ORAL ONCE
Status: COMPLETED | OUTPATIENT
Start: 2025-05-04 | End: 2025-05-04

## 2025-05-04 RX ORDER — ONDANSETRON 2 MG/ML
4 INJECTION INTRAMUSCULAR; INTRAVENOUS ONCE
Status: COMPLETED | OUTPATIENT
Start: 2025-05-04 | End: 2025-05-04

## 2025-05-04 RX ORDER — IOPAMIDOL 755 MG/ML
75 INJECTION, SOLUTION INTRAVASCULAR ONCE
Status: COMPLETED | OUTPATIENT
Start: 2025-05-04 | End: 2025-05-04

## 2025-05-04 RX ORDER — DIAZEPAM 5 MG/1
5 TABLET ORAL EVERY 8 HOURS PRN
Qty: 9 TABLET | Refills: 0 | Status: SHIPPED | OUTPATIENT
Start: 2025-05-04

## 2025-05-04 RX ADMIN — IOPAMIDOL 75 ML: 755 INJECTION, SOLUTION INTRAVENOUS at 18:35

## 2025-05-04 RX ADMIN — ONDANSETRON 4 MG: 2 INJECTION, SOLUTION INTRAMUSCULAR; INTRAVENOUS at 17:15

## 2025-05-04 RX ADMIN — SODIUM CHLORIDE 1000 ML: 0.9 INJECTION, SOLUTION INTRAVENOUS at 17:16

## 2025-05-04 RX ADMIN — MECLIZINE 25 MG: 25 TABLET ORAL at 18:23

## 2025-05-04 ASSESSMENT — ACTIVITIES OF DAILY LIVING (ADL)
ADLS_ACUITY_SCORE: 41

## 2025-05-04 ASSESSMENT — COLUMBIA-SUICIDE SEVERITY RATING SCALE - C-SSRS
1. IN THE PAST MONTH, HAVE YOU WISHED YOU WERE DEAD OR WISHED YOU COULD GO TO SLEEP AND NOT WAKE UP?: NO
6. HAVE YOU EVER DONE ANYTHING, STARTED TO DO ANYTHING, OR PREPARED TO DO ANYTHING TO END YOUR LIFE?: NO
2. HAVE YOU ACTUALLY HAD ANY THOUGHTS OF KILLING YOURSELF IN THE PAST MONTH?: NO

## 2025-05-04 NOTE — ED TRIAGE NOTES
Patient went on a 5 mile moderate hike this afternoon. She drove home and after getting there, states she started to feel hot and dizzy. Nausea and vomiting started after and patient was unable to keep down liquids.      Triage Assessment (Adult)       Row Name 05/04/25 4649          Triage Assessment    Airway WDL WDL        Respiratory WDL    Respiratory WDL WDL        Skin Circulation/Temperature WDL    Skin Circulation/Temperature WDL WDL        Cardiac WDL    Cardiac WDL WDL        Peripheral/Neurovascular WDL    Peripheral Neurovascular WDL WDL        Cognitive/Neuro/Behavioral WDL    Cognitive/Neuro/Behavioral WDL WDL

## 2025-05-04 NOTE — ED PROVIDER NOTES
"  Emergency Department Encounter     Evaluation Date & Time:   5/4/2025  4:30 PM    CHIEF COMPLAINT:  Nausea & Vomiting and Dizziness      Triage Note:Patient arrives for nausea, vomiting, and dizziness that started 1 hour ago. Patient just arrived back from a 5 mile hike. States she felt hot, lightheaded, and flushed after arriving home.         ED COURSE & MEDICAL DECISION MAKING:     Pt relatively healthy, here after a 5 mile hike in heat, got lightheaded upon returning home with ultimately n/v.  No cp/sob, abd pain, vitally well here.  Neuro intact with no focal deficits on history/exam to warrant CT or MRI brain imaging. Pt has absolutely no cp/sob or abdominal pain to suggest ACS or similar.  Will get labs, EKG, hydrate, try zofran and reassess.      ED Course as of 05/04/25 2118   Sun May 04, 2025   1752 Labs all reassuring.     1804 Pt re-evaluated, updated.  She's still feeling \"dizzy\". Attempted to ambulate her and unsteady, so getting CTA head/neck to rule out vertebral artery dissection or evidence of stroke.  PO meclizine ordered. She otherwise remains entirely neuro intact.  Normotensive.   1927 CTA imaging all normal.   2000 Pt re-evaluated, updated. She's feeling much better. Symptoms only with sudden head movements, resolve with sitting still. Remains neuro intact. Suspect this is BPPV.  Offered MRI, but pt declined. Will start on meclizine regularly, valium for breakthrough vertigo. Advised on expected course, follow up, return precautions.           Medical Decision Making  I obtained history from Family Member/Significant Other  Care impacted by Hyperlipidemia  Discharge. I prescribed additional prescription strength medication(s) as charted. I considered admission, but ultimately discharged patient after shared decision making/discussion and improvement with outpatient treatment and follow up plan.    MIPS (CTPE, Dental pain, Brewer, Sinusitis, Asthma/COPD, Head Trauma): Not Applicable    SEPSIS: " None          MEDICATIONS GIVEN IN THE EMERGENCY DEPARTMENT:  Medications   sodium chloride 0.9% BOLUS 1,000 mL (0 mLs Intravenous Stopped 5/4/25 1823)   ondansetron (ZOFRAN) injection 4 mg (4 mg Intravenous $Given 5/4/25 1715)   meclizine (ANTIVERT) tablet 25 mg (25 mg Oral $Given 5/4/25 1823)   iopamidol (ISOVUE-370) solution 75 mL (75 mLs Intravenous $Given 5/4/25 1835)       NEW PRESCRIPTIONS STARTED AT TODAY'S ED VISIT:  Discharge Medication List as of 5/4/2025  8:06 PM        START taking these medications    Details   diazepam (VALIUM) 5 MG tablet Take 1 tablet (5 mg) by mouth every 8 hours as needed (vertigo/dizziness)., Disp-9 tablet, R-0, Local Print      meclizine (ANTIVERT) 25 MG tablet Take 1 tablet (25 mg) by mouth 3 times daily as needed for dizziness., Disp-21 tablet, R-0, Local Print             HPI   HPI     Solange Giron is a 64 year old female with a pertinent history of HLD who presents to this ED via walk-in with significant other for evaluation of lightheadedness and n/v starting 1.5 hours ago.  Pt reports she and significant other were out hiking in hot weather for 5 miles, no fluids while out.  She got a little lightheaded on drive home, felt worse in home and started to try and drink some water, eat a salt roll, but ended up vomiting twice.  Pt denies room spinning, HA, vision changes, numbness/weakness, cp/sob, abd pain, syncope, leg pain/swelling.  No treatment pta.      REVIEW OF SYSTEMS:  See HPI      Medical History   No past medical history on file.    Past Surgical History:   Procedure Laterality Date    HC REMOVAL OF TONSILS,12+ Y/O      Description: Tonsillectomy Over Age 12;  Recorded: 07/16/2010;       Family History   Problem Relation Age of Onset    Alzheimer Disease Mother     Hyperlipidemia Mother     Hyperlipidemia Father     Cerebrovascular Disease Father     Breast Cancer Paternal Aunt     Dyslipidemia Paternal Aunt     Dyslipidemia Paternal Uncle     Hyperlipidemia  "Maternal Aunt     Hyperlipidemia Brother        Social History     Tobacco Use    Smoking status: Never    Smokeless tobacco: Never       atorvastatin (LIPITOR) 10 MG tablet  diazepam (VALIUM) 5 MG tablet  loperamide (IMODIUM A-D) 2 mg tablet  meclizine (ANTIVERT) 25 MG tablet        Physical Exam     Vitals:  /73   Pulse 57   Resp 23   Ht 1.575 m (5' 2\")   Wt 59.9 kg (132 lb)   SpO2 97%   BMI 24.14 kg/m      PHYSICAL EXAM:   Physical Exam  Vitals and nursing note reviewed.   Constitutional:       General: She is not in acute distress.     Appearance: Normal appearance.   HENT:      Head: Normocephalic and atraumatic.      Nose: Nose normal.      Mouth/Throat:      Mouth: Mucous membranes are moist.   Eyes:      Pupils: Pupils are equal, round, and reactive to light.   Cardiovascular:      Rate and Rhythm: Normal rate and regular rhythm.      Pulses: Normal pulses.           Radial pulses are 2+ on the right side and 2+ on the left side.        Dorsalis pedis pulses are 2+ on the right side and 2+ on the left side.   Pulmonary:      Effort: Pulmonary effort is normal. No respiratory distress.      Breath sounds: Normal breath sounds.   Abdominal:      Palpations: Abdomen is soft.      Tenderness: There is no abdominal tenderness.   Musculoskeletal:      Cervical back: Full passive range of motion without pain and neck supple.      Comments: No calf tenderness or swelling b/l   Skin:     General: Skin is warm.      Findings: No rash.   Neurological:      General: No focal deficit present.      Mental Status: She is alert and oriented to person, place, and time. Mental status is at baseline.      GCS: GCS eye subscore is 4. GCS verbal subscore is 5. GCS motor subscore is 6.      Cranial Nerves: Cranial nerves 2-12 are intact.      Sensory: Sensation is intact.      Motor: Motor function is intact.      Coordination: Coordination is intact.      Gait: Gait is intact.      Comments: Fluent speech, no " pronator drift or facial asymmetry, 5/5 strength b/l UE/LE, normal finger to nose b/l, no acute lateralizing deficits   Psychiatric:         Mood and Affect: Mood normal.         Behavior: Behavior normal.           Results     LAB:  All pertinent labs reviewed and interpreted  Labs Ordered and Resulted from Time of ED Arrival to Time of ED Departure   BASIC METABOLIC PANEL - Abnormal       Result Value    Sodium 137      Potassium 3.5      Chloride 102      Carbon Dioxide (CO2) 24      Anion Gap 11      Urea Nitrogen 13.4      Creatinine 0.82      GFR Estimate 79      Calcium 9.6      Glucose 120 (*)    HEPATIC FUNCTION PANEL - Abnormal    Protein Total 6.1 (*)     Albumin 3.9      Bilirubin Total 0.3      Alkaline Phosphatase 53      AST 37      ALT 34      Bilirubin Direct 0.15     MAGNESIUM - Normal    Magnesium 2.0     CBC WITH PLATELETS AND DIFFERENTIAL    WBC Count 7.0      RBC Count 4.68      Hemoglobin 13.6      Hematocrit 39.3      MCV 84      MCH 29.1      MCHC 34.6      RDW 13.1      Platelet Count 163      % Neutrophils 61      % Lymphocytes 26      % Monocytes 10      % Eosinophils 3      % Basophils 0      % Immature Granulocytes 0      NRBCs per 100 WBC 0      Absolute Neutrophils 4.2      Absolute Lymphocytes 1.8      Absolute Monocytes 0.7      Absolute Eosinophils 0.2      Absolute Basophils 0.0      Absolute Immature Granulocytes 0.0      Absolute NRBCs 0.0         RADIOLOGY:  CTA Head Neck with Contrast   Final Result   IMPRESSION:    HEAD CT:   1.  No acute intracranial abnormality or significant change compared to the prior study.       HEAD CTA:    1.  Normal CTA [place].      NECK CTA:   1.  No high-grade stenosis or dissection.                   ECG:  Sinus josé, rate 53, normal intervals, no STEMI    I have independently reviewed and interpreted the EKG(s) documented above     PROCEDURES:  Procedures:  none      FINAL IMPRESSION:    ICD-10-CM    1. Benign paroxysmal positional  vertigo, unspecified laterality  H81.10           0 minutes of critical care time        Ruslan Espinosa DO  Emergency Medicine  [facility] EMERGENCY ROOM  5/4/2025  4:49 PM          Ruslan Espinosa MD  05/04/25 2233

## 2025-05-05 LAB
ATRIAL RATE - MUSE: 53 BPM
DIASTOLIC BLOOD PRESSURE - MUSE: NORMAL MMHG
INTERPRETATION ECG - MUSE: NORMAL
P AXIS - MUSE: 61 DEGREES
PR INTERVAL - MUSE: 174 MS
QRS DURATION - MUSE: 98 MS
QT - MUSE: 484 MS
QTC - MUSE: 454 MS
R AXIS - MUSE: 75 DEGREES
SYSTOLIC BLOOD PRESSURE - MUSE: NORMAL MMHG
T AXIS - MUSE: 48 DEGREES
VENTRICULAR RATE- MUSE: 53 BPM

## 2025-05-05 NOTE — DISCHARGE INSTRUCTIONS
Take meclizine regularly for next 2 days or so. Take valium for breakthrough vertigo/dizziness. Hydrate well. Follow up with primary clinic and consider follow up with ENT.